# Patient Record
Sex: FEMALE
[De-identification: names, ages, dates, MRNs, and addresses within clinical notes are randomized per-mention and may not be internally consistent; named-entity substitution may affect disease eponyms.]

---

## 2018-08-16 ENCOUNTER — HOSPITAL ENCOUNTER (EMERGENCY)
Dept: HOSPITAL 14 - H.ER | Age: 23
Discharge: HOME | End: 2018-08-16
Payer: COMMERCIAL

## 2018-08-16 VITALS
OXYGEN SATURATION: 100 % | DIASTOLIC BLOOD PRESSURE: 73 MMHG | TEMPERATURE: 98.3 F | SYSTOLIC BLOOD PRESSURE: 114 MMHG | HEART RATE: 85 BPM

## 2018-08-16 VITALS — RESPIRATION RATE: 18 BRPM

## 2018-08-16 DIAGNOSIS — T78.40XA: Primary | ICD-10-CM

## 2018-08-16 DIAGNOSIS — Z91.018: ICD-10-CM

## 2018-08-16 PROCEDURE — 96375 TX/PRO/DX INJ NEW DRUG ADDON: CPT

## 2018-08-16 PROCEDURE — 99284 EMERGENCY DEPT VISIT MOD MDM: CPT

## 2018-08-16 PROCEDURE — 96374 THER/PROPH/DIAG INJ IV PUSH: CPT

## 2018-08-16 NOTE — ED PDOC
HPI: Allergic Reaction


Time Seen by Provider: 08/16/18 21:27


Chief Complaint (Nursing): Allergic Reaction


Chief Complaint (Provider): Allergic reaction


History Per: Patient


History/Exam Limitations: no limitations


Onset/Duration Of Symptoms: Mins


Current Symptoms Are (Timing): Still Present


Context: Food


Possible Cause: Food


Associated Symptoms: Skin Rash, Itching


Home/EMS Treatment: None


Additional History Per: Patient


Additional Complaint(s): 


22yo female, history of coconut and nut allergy, come to ER for evaluation 

after exposure to nuts. Patient state 1 hour ago, she was having a friend's 

salad which contained nuts and subsequently broke out in itchy hives all over 

her body. She denies any shortness of breath or throat closing sensation. No 

other complaints.


PMD: None provided





Past Medical History


Reviewed: Historical Data, Nursing Documentation, Vital Signs


Vital Signs: 


 Last Vital Signs











Temp  97.8 F   08/16/18 21:16


 


Pulse  115 H  08/16/18 21:16


 


Resp  18   08/16/18 21:16


 


BP  106/65   08/16/18 21:16


 


Pulse Ox  94 L  08/16/18 21:16














- Medical History


PMH: No Chronic Diseases





- Surgical History


Surgical History: No Surg Hx





- Family History


Family History: States: No Known Family Hx





- Home Medications


Home Medications: 


 Ambulatory Orders











 Medication  Instructions  Recorded


 


Epinephrine [Epipen] 0.3 mg IJ PRN PRN #2 auto.injct 08/16/18














- Allergies


Allergies/Adverse Reactions: 


 Allergies











Allergy/AdvReac Type Severity Reaction Status Date / Time


 


coconut Allergy  SWELLING Verified 08/16/18 21:19


 


nut - unspecified Allergy  SWELLING Verified 08/16/18 21:19














Review of Systems


ROS Statement: Except As Marked, All Systems Reviewed And Found Negative


ENT: Negative for: Mouth Swelling, Throat Swelling


Respiratory: Negative for: Shortness of Breath


Skin: Positive for: Rash





Physical Exam





- Reviewed


Nursing Documentation Reviewed: Yes


Vital Signs Reviewed: Yes





- Physical Exam


Appears: Positive for: Non-toxic


Head Exam: Positive for: ATRAUMATIC, NORMAL INSPECTION, NORMOCEPHALIC


Skin: Positive for: Warm, Dry, Rash (diffuse erythematous, blanching, 

urticarial rash noted to body)


Eye Exam: Positive for: Normal appearance


ENT: Positive for: Normal ENT Inspection (uvula midline, no edema noted.)


Neck: Positive for: Supple


Cardiovascular/Chest: Positive for: Regular Rate, Rhythm


Respiratory: Positive for: Normal Breath Sounds.  Negative for: Wheezing


Gastrointestinal/Abdominal: Positive for: Normal Exam, Soft.  Negative for: 

Tenderness


Back: Positive for: Normal Inspection


Extremity: Positive for: Normal ROM


Neurologic/Psych: Positive for: Alert, Oriented





- ECG


O2 Sat by Pulse Oximetry: 94





Disposition





- Clinical Impression


Clinical Impression: 


 Allergic reaction








- Disposition


Referrals: 


[facility] [Provider Group]


Disposition: Routine/Home


Disposition Time: 23:00


Condition: IMPROVED


Prescriptions: 


Epinephrine [Epipen] 0.3 mg IJ PRN PRN #2 auto.injct


 PRN Reason: Anaphylaxis


Instructions:  Food Allergy, Hives (DC)


Forms:  Needle HR (English)





Medical Decision Making


Medical Decision Making: 


Assessment: 22yo female with history of nut allergy presents for evaluation of 

allergic reaction


Patient's ABC's intact.


No concern for impending airway compromise


Plan:


* Benadryl 50mg IVP


* Pepcid 20mg IVP


* Solumedrol 125mg IVP


* IV Fluids 1L





1000PM


Improving, patient's rash is dissipating





1100PM


Patient significantly improved.  Advised to keep epi-pen with her at all times.

  Return precautions discussed.





Scribe Attestation:


Documented by Kirti Wyman, acting as a scribe for Bonilla Mathews MD.





Provider Scribe Attestation:


All medical record entries made by the Scribe were at my direction and 

personally dictated by me. I have reviewed the chart and agree that the record 

accurately reflects my personal performance of the history, physical exam, 

medical decision making, and the department course for this patient. I have 

also personally directed, reviewed, and agree with the discharge instructions 

and disposition.